# Patient Record
Sex: MALE | Race: WHITE | URBAN - METROPOLITAN AREA
[De-identification: names, ages, dates, MRNs, and addresses within clinical notes are randomized per-mention and may not be internally consistent; named-entity substitution may affect disease eponyms.]

---

## 2018-08-27 ENCOUNTER — APPOINTMENT (OUTPATIENT)
Dept: URBAN - METROPOLITAN AREA SURGERY 18 | Age: 43
Setting detail: DERMATOLOGY
End: 2018-08-27

## 2018-08-27 VITALS — SYSTOLIC BLOOD PRESSURE: 123 MMHG | HEART RATE: 62 BPM | DIASTOLIC BLOOD PRESSURE: 73 MMHG

## 2018-08-27 PROBLEM — D04.22 CARCINOMA IN SITU OF SKIN OF LEFT EAR AND EXTERNAL AURICULAR CANAL: Status: ACTIVE | Noted: 2018-08-27

## 2018-08-27 PROCEDURE — 15260 FTH/GFT FR N/E/E/L 20 SQCM/<: CPT

## 2018-08-27 PROCEDURE — 17311 MOHS 1 STAGE H/N/HF/G: CPT

## 2018-08-27 PROCEDURE — 17312 MOHS ADDL STAGE: CPT

## 2018-08-27 PROCEDURE — OTHER MOHS SURGERY: OTHER

## 2018-08-27 NOTE — PROCEDURE: MOHS SURGERY
Referred To Oculoplastics For Closure Text (Leave Blank If You Do Not Want): After obtaining clear surgical margins the patient was sent to oculoplastics for surgical repair.  The patient understands they will receive post-surgical care and follow-up from the oculoplastic surgeon's and referring physician's office, and may follow up in our surgical clinic as needed.

## 2018-08-27 NOTE — PROCEDURE: MOHS SURGERY
Advancement Flap (Single) Text: Given the location of the defect, inherent tension at the surgical site, and the proximity to free margins an advancement flap was deemed most appropriate for wound reconstruction. The risks, benefits, and possible outcomes of this procedure were discussed along with the risks, benefits, and possible outcomes of other options for wound repair (including but not limited to: complex closure, other flaps, grafts, and second intention). The patient verbalized understanding and consent to the outlined procedure. The patient verbalized understanding that intraoperative conditions may necessitate a change in the outlined procedure resulting in modification of the original surgical plan. This decision may be made at the discretion of the surgeon, and is due to factors subject to change or that are difficult to predict preoperatively. Using a sterile surgical marker, an appropriate advancement flap was drawn incorporating the defect and placing the expected incisions within the relaxed skin tension lines where possible. The surgical site and surrounding skin were prepped and draped in sterile fashion.  Standing cones were removed from opposite ends of the defect within the appropriate surgical plane, repositioning as necessary based upon local tension, proximity to free margins/other anatomic structures, and position of the relaxed skin tension lines. The resulting defect was undermined widely and bilaterally in the appropriate surgical plane taking care to preserve local arteries, veins, nerves, and other structures of anatomic importance. This created a sliding flap capable of advancing across the defect to close the wound under minimal tension. Hemostasis was achieved and then the wound was sutured in layered fashion.

## 2018-08-27 NOTE — PROCEDURE: MOHS SURGERY
Post-Care Instructions: I reviewed with the patient and present family members in detail post-care instructions. Patient is not to engage in any heavy lifting, exercise, or swimming for the next 14 days, resuming light to moderate and then regular exercise as directed. Should the patient develop any fevers, chills, bleeding, severe pain patient will contact the office immediately.

## 2018-08-27 NOTE — PROCEDURE: MOHS SURGERY
Referred To Otolaryngology For Closure Text (Leave Blank If You Do Not Want): After obtaining clear surgical margins the patient was sent to otolaryngology for surgical repair.  The patient understands they will receive post-surgical care and follow-up from the Otolaryngologyist's and referring physician's office, and may follow up in our surgical clinic as needed.

## 2018-08-27 NOTE — PROCEDURE: MOHS SURGERY
Bilobed Transposition Flap Text: Given the location of the defect, inherent tension at the surgical site, and the proximity to free margins a bilobe transposition flap was deemed most appropriate for wound reconstruction. The risks, benefits, and possible outcomes of this procedure were discussed along with the risks, benefits, and possible outcomes of other options for wound repair (including but not limited to: complex closure, other flaps, grafts, and second intention). The patient verbalized understanding and consent to the outlined procedure. The patient verbalized understanding that intraoperative conditions may necessitate a change in the outlined procedure resulting in modification of the original surgical plan. This decision may be made at the discretion of the surgeon, and is due to factors subject to change or that are difficult to predict preoperatively. We also discussed that should the patient heal with a scar they find noticeable/unfavorable additional surgical procedures and/or referral to additional specialists may be required. The patient understands the healing process takes place over many months and that the \"final\" appearance of the scar line often takes a year or longer to achieve.\\n\\nUsing a sterile surgical marker, an appropriate bilobe transposition flap was drawn incorporating the defect and placing the expected incisions within the relaxed skin tension lines where possible. The surgical site and surrounding skin were prepped and draped in sterile fashion.  A single standing cone was removed at the expected pivot point of the transposition flap in the appropriate surgical plane, repositioning as necessary based upon local tension, proximity to free margins/other anatomic structures, and position of the relaxed skin tension lines. The flap (containing two lobes  by 45 degrees, each proportionate in size to the postop defect) was incised and raised in the appropriate surgical plane (just below the level of the nasalis musculature). The resulting defect was undermined widely and bilaterally in the appropriate surgical plane taking care to preserve local arteries, veins, nerves, and other structures of anatomic importance. This created a flap capable of transposing across the defect to close the wound under minimal tension, without distortion of adjacent free margins. Hemostasis was achieved and then the wound was sutured in layered fashion.

## 2018-08-27 NOTE — PROCEDURE: MOHS SURGERY
Island Pedicle Flap Text: Given the location of the defect, inherent tension at the surgical site, and the proximity to free margins an island pedicle advancement flap was deemed most appropriate for wound reconstruction. The risks, benefits, and possible outcomes of this procedure were discussed along with the risks, benefits, and possible outcomes of other options for wound repair (including but not limited to: complex closure, other flaps, grafts, and second intention). The patient verbalized understanding and consent to the outlined procedure. The patient verbalized understanding that intraoperative conditions may necessitate a change in the outlined procedure resulting in modification of the original surgical plan. This decision may be made at the discretion of the surgeon, and is due to factors subject to change or that are difficult to predict preoperatively. \\n\\nUsing a sterile surgical marker, an appropriate island pedicle advancement flap was drawn incorporating the defect and placing the expected incisions within the relaxed skin tension lines where possible. The surgical site and surrounding skin were prepped and draped in sterile fashion.  The island pedicle advancement flap was incised and undermined within the appropriate surgical plane, creating a mobile flap supported by a central vascular pedicle.  The wound edges surrounding the flap and primary defect were undermined widely and bilaterally in the appropriate surgical plane taking care to preserve local arteries, veins, nerves, and other structures of anatomic importance. This created a sliding flap capable of advancing across the defect to close the wound under minimal tension. Hemostasis was achieved and then the wound was sutured in layered fashion.

## 2018-08-27 NOTE — PROCEDURE: MOHS SURGERY
Body Location Override (Optional - Billing Will Still Be Based On Selected Body Map Location If Applicable): Left Superior Helix

## 2018-08-27 NOTE — PROCEDURE: MOHS SURGERY
Advancement Flap (Double) Text: Given the location of the defect, inherent tension at the surgical site, and the proximity to free margins a double (bilateral) advancement flap was deemed most appropriate for wound reconstruction. The risks, benefits, and possible outcomes of this procedure were discussed along with the risks, benefits, and possible outcomes of other options for wound repair (including but not limited to: complex closure, other flaps, grafts, and second intention). The patient verbalized understanding and consent to the outlined procedure. The patient verbalized understanding that intraoperative conditions may necessitate a change in the outlined procedure resulting in modification of the original surgical plan. This decision may be made at the discretion of the surgeon, and is due to factors subject to change or that are difficult to predict preoperatively. Using a sterile surgical marker, an appropriate bilateral advancement flap was drawn incorporating the defect and placing the expected incisions within the relaxed skin tension lines where possible. The surgical site and surrounding skin were prepped and draped in sterile fashion.  Standing cones were removed from opposite ends of the defect within the appropriate surgical plane, repositioning as necessary based upon local tension, proximity to free margins/other anatomic structures, and position of the relaxed skin tension lines. The resulting defect was undermined widely and bilaterally in the appropriate surgical plane taking care to preserve local arteries, veins, nerves, and other structures of anatomic importance. This created a sliding flap capable of advancing bilaterally across the defect to close the wound under minimal tension. Hemostasis was achieved and then the wound was sutured in layered fashion.

## 2018-08-27 NOTE — PROCEDURE: MOHS SURGERY
Modified Advancement Flap Text: Given the location of the defect, inherent tension at the surgical site, and the proximity to free margins a modified advancement flap was deemed most appropriate for wound reconstruction. The risks, benefits, and possible outcomes of this procedure were discussed along with the risks, benefits, and possible outcomes of other options for wound repair (including but not limited to: complex closure, other flaps, grafts, and second intention). The patient verbalized understanding and consent to the outlined procedure. The patient verbalized understanding that intraoperative conditions may necessitate a change in the outlined procedure resulting in modification of the original surgical plan. This decision may be made at the discretion of the surgeon, and is due to factors subject to change or that are difficult to predict preoperatively. Using a sterile surgical marker, an appropriate advancement flap was drawn incorporating the defect and placing the expected incisions within the relaxed skin tension lines where possible. Prior to anesthetization the vermillion border/white roll were demarcated with a sterile marker to aid in reapproximation during wound closure. \\n\\nThe surgical site and surrounding skin were prepped and draped in sterile fashion.  Standing cones were removed from opposite ends of the defect within the appropriate surgical plane, repositioning as necessary based upon local tension, proximity to free margins/other anatomic structures, and position of the relaxed skin tension lines. The resulting defect was undermined widely and bilaterally in the appropriate surgical plane taking care to preserve local arteries, veins, nerves, and other structures of anatomic importance. This created a sliding flap capable of advancing across the defect to close the wound under minimal tension. Care was taken to preserve the orbicularis muscle during standing cone removal and undermining. Hemostasis was achieved and then the wound was sutured in layered fashion.\\n

## 2018-08-27 NOTE — PROCEDURE: MOHS SURGERY
Helical Rim Advancement Flap Text: Given the location of the defect, inherent tension at the surgical site, and the proximity to free margins a helical rim advancement flap was deemed most appropriate for wound reconstruction. The risks, benefits, and possible outcomes of this procedure were discussed along with the risks, benefits, and possible outcomes of other options for wound repair (including but not limited to: complex closure, other flaps, grafts, and second intention). The patient verbalized understanding and consent to the outlined procedure. The patient verbalized understanding that intraoperative conditions may necessitate a change in the outlined procedure resulting in modification of the original surgical plan. This decision may be made at the discretion of the surgeon, and is due to factors subject to change or that are difficult to predict preoperatively. Using a sterile surgical marker, an appropriate helical rim advancement flap was drawn incorporating the defect and placing the expected incisions within the relaxed skin tension lines where possible. The surgical site and surrounding skin were prepped and draped in sterile fashion. Anesthesia was checked and supplemented as necessary. A single standing cone was then excised posterior to the defect. A linear arc-like incision was removed at the anterior aspect of the defect and carried to a point of relative auricular skin laxity. A second dog ear was then removed at the end of this incision. The flap was undermined widely and bilaterally in the appropriate surgical plane. Hemostasis was obtained and then the flap was sutured together in layered fashion.

## 2018-08-27 NOTE — PROCEDURE: MOHS SURGERY
Intermediate Repair Preamble Text (Leave Blank If You Do Not Want): The risks, benefits, and possible outcomes of this procedure were discussed along with the risks, benefits, and possible outcomes of other options for wound repair (including but not limited to: variations of complex closure, flaps, grafts, and second intention). The patient verbalized understanding and consent to the outlined procedure. The patient understands should the surgical site heal in a manner they find unsatisfactory further interventions or referral to an additional specialist may be required. Following sterile preparation and drape, two standing cones were removed at opposite ends of the postoperative defect (as needed) within, or parallel to, the relaxed skin tension lines. The resulting defect was undermined, as necessary, in the appropriate surgical plane taking care to preserve local arteries, veins, nerves, and other structures of anatomic importance. Hemostasis was achieved and then the wound was sutured together in layered fashion.

## 2018-08-27 NOTE — PROCEDURE: MOHS SURGERY
Melolabial Transposition Flap Text: Given the size/depth/location of the defect, inherent tension at the surgical site, and the proximity to free margins, a one stage nasolabial transposition flap was deemed most appropriate for wound reconstruction. The risks, benefits, and possible outcomes of this procedure were discussed along with the risks, benefits, and possible outcomes of other options for wound repair (including but not limited to: complex closure, other flaps, grafts, second intention, and combinations of these procedures). The patient verbalized understanding and consent to the outlined procedure. The patient verbalized understanding that intraoperative conditions may necessitate a change in the outlined procedure resulting in modification of the original surgical plan. This decision may be made at the discretion of the surgeon, and is due to factors subject to change or that are difficult to predict preoperatively. We also discussed complex defects involving multiple cosmetic subunits often necessitate several smaller planned surgical revisions spread out over the course of many months in order to restore appropriate tissue volume, cosmetic landmarks, and diminish scarring. In many cases anatomic “semblance” is possible, but complete parity is not. The patient understands that in some cases referral to additional specialists for additional procedures may be required. \\n\\nUsing a sterile surgical marker, an appropriate nasolabial transposition flap was drawn incorporating the defect and placing the expected incisions within the relaxed skin tension lines where possible. The surgical site and surrounding skin were prepped and draped in sterile fashion. Anesthesia was checked and supplemented as necessary.  A single standing cone was removed along the nasal sidewall proximal to the surgical defect. The flap and second standing cone were incised and raised from the medial cheek in the mid adipose. The entire flap and area surrounding the removed standing cones was undermined widely and bilaterally in the appropriate surgical plane taking care to preserve local arteries, veins, nerves, and other structures of anatomic importance. The area along the distal nasal portion of the post Mohs defect was undermined just enough to place deep buried vertical mattress sutures. This created a flap capable of transposing into the defect to drape the wound under minimal tension. Hemostasis was achieved. A single anchoring buried vertical mattress suture was placed in the deep tissue/periosteum surrounding the pyriform aperture to advance the cheek portion of the flap without distortion of the free margin of the ala. The remaining flap was sutured in layered fashion.

## 2018-08-27 NOTE — PROCEDURE: MOHS SURGERY
Graft Donor Site Bandage (Optional-Leave Blank If You Don't Want In Note): Vaseline, a nonstick bandage, brown tape and a pressure bandage were applied to the donor site.

## 2018-08-27 NOTE — PROCEDURE: MOHS SURGERY
A-T Advancement Flap Text: Given the location of the defect, inherent tension at the surgical site, and the proximity to free margins an A to T advancement flap was deemed most appropriate for wound reconstruction. The risks, benefits, and possible outcomes of this procedure were discussed along with the risks, benefits, and possible outcomes of other options for wound repair (including but not limited to: complex closure, other flaps, grafts, and second intention). The patient verbalized understanding and consent to the outlined procedure. The patient verbalized understanding that intraoperative conditions may necessitate a change in the outlined procedure resulting in modification of the original surgical plan. This decision may be made at the discretion of the surgeon, and is due to factors subject to change or that are difficult to predict preoperatively. Using a sterile surgical marker, an appropriate A to T advancement flap was drawn incorporating the defect and placing the expected incisions within the relaxed skin tension lines where possible. The surgical site and surrounding skin were prepped and draped in sterile fashion.  Standing cones were removed from opposite ends of the defect within the appropriate surgical plane, repositioning as necessary based upon local tension, proximity to free margins/other anatomic structures, and position of the relaxed skin tension lines. The shape of one standing cone was modified, taking two smaller standing cones (each approximately half the size of the traditional standing cone) and repositioning them along opposite ends of a lateral plane (with respect to the defect).  The resulting defect was undermined widely and bilaterally in the appropriate surgical plane taking care to preserve local arteries, veins, nerves, and other structures of anatomic importance. This created a sliding flap capable of advancing across the defect to close the wound under minimal tension. Hemostasis was achieved and then the wound was sutured in layered fashion.

## 2018-08-27 NOTE — PROCEDURE: MOHS SURGERY
Unique Flap 1 Text: Given the location of the defect, inherent tension at the surgical site, and the proximity to free margins a Mercedes (Tripolar) Advancement Flap was deemed most appropriate for wound reconstruction. The risks, benefits, and possible outcomes of this procedure were discussed along with the risks, benefits, and possible outcomes of other options for wound repair (including but not limited to: complex closure, other flaps, grafts, and second intention). The patient verbalized understanding and consent to the outlined procedure. The patient verbalized understanding that intraoperative conditions may necessitate a change in the outlined procedure resulting in modification of the original surgical plan. This decision may be made at the discretion of the surgeon, and is due to factors subject to change or that are difficult to predict preoperatively. Using a sterile surgical marker, an appropriate tripolar advancement flap was drawn incorporating the defect and placing the expected incisions within the relaxed skin tension lines where possible. The surgical site and surrounding skin were prepped and draped in sterile fashion.  Anesthesia was checked and supplemented as necessary. Standing cones were removed from three “poles” of the defect within the appropriate surgical plane, repositioning as necessary based upon local tension, proximity to free margins/other anatomic structures, and position of the relaxed skin tension lines. The resulting defect was undermined widely and bilaterally in the appropriate surgical plane taking care to preserve local arteries, veins, nerves, and other structures of anatomic importance. This created a sliding flap capable of advancing across the defect from three reservoirs to close the wound under minimal tension. Hemostasis was achieved and then the wound was sutured in layered fashion.

## 2018-08-27 NOTE — PROCEDURE: MOHS SURGERY
Referred To Plastics For Closure Text (Leave Blank If You Do Not Want): After obtaining clear surgical margins the patient was sent to plastics for surgical repair. The patient understands they will receive post-surgical care and follow-up from the plastic surgeon's and referring physician's office, and may follow up in our surgical clinic as needed.

## 2018-08-27 NOTE — PROCEDURE: MOHS SURGERY
Burow's Advancement Flap Text: Given the location of the defect, inherent tension at the surgical site, and the proximity to free margins a Burow’s advancement flap was deemed most appropriate for wound reconstruction. The risks, benefits, and possible outcomes of this procedure were discussed along with the risks, benefits, and possible outcomes of other options for wound repair (including but not limited to: complex closure, other flaps, grafts, and second intention). The patient verbalized understanding and consent to the outlined procedure. The patient verbalized understanding that intraoperative conditions may necessitate a change in the outlined procedure resulting in modification of the original surgical plan. This decision may be made at the discretion of the surgeon, and is due to factors subject to change or that are difficult to predict preoperatively. Using a sterile surgical marker, an appropriate Burow’s advancement flap was drawn incorporating the defect and placing the expected incisions within the relaxed skin tension lines where possible. The surgical site and surrounding skin were prepped and draped in sterile fashion.  Standing cones were removed from opposite ends of the defect within the appropriate surgical plane, repositioning as necessary based upon local tension, proximity to free margins/other anatomic structures, and position of the relaxed skin tension lines. The position of one dog ear was modified, moving the standing cone along a lateral plane to lie in a more favorable location for closure.  The resulting defect was undermined widely and bilaterally in the appropriate surgical plane taking care to preserve local arteries, veins, nerves, and other structures of anatomic importance. This created a sliding flap capable of advancing across the defect to close the wound under minimal tension. Hemostasis was achieved and then the wound was sutured in layered fashion.

## 2018-08-27 NOTE — PROCEDURE: MOHS SURGERY
Secondary Intention Text (Leave Blank If You Do Not Want): Given the location of the defect, depth of the postoperative wound and inherent tension at the surgical site, healing by secondary intention was deemed most appropriate for wound closure. The risks, benefits, and possible outcomes of this procedure were discussed along with the risks, benefits, and possible outcomes of other options for wound repair (including but not limited to: complex closure, flaps, and grafts). The patient verbalized understanding and voiced a preference for secondary intention over reconstruction. The patient understands that not all healing occurs in predictable fashion or within a predictable time period. If the resulting scar is unsatisfactory, affects/distorts a free margin or other adjacent structures, or is slow to heal the patient understands further surgical intervention or even referral to an additional specialist may be required.

## 2018-08-27 NOTE — PROCEDURE: MOHS SURGERY
Crescentic Advancement Flap Text: Given the location of the defect, inherent tension at the surgical site, and the proximity to free margins a Crescentic advancement flap was deemed most appropriate for wound reconstruction. The risks, benefits, and possible outcomes of this procedure were discussed along with the risks, benefits, and possible outcomes of other options for wound repair (including but not limited to: complex closure, other flaps, grafts, and second intention). The patient verbalized understanding and consent to the outlined procedure. The patient verbalized understanding that intraoperative conditions may necessitate a change in the outlined procedure resulting in modification of the original surgical plan. This decision may be made at the discretion of the surgeon, and is due to factors subject to change or that are difficult to predict preoperatively. Using a sterile surgical marker, an appropriate Crescentic advancement flap was drawn incorporating the defect and placing the expected incisions within the relaxed skin tension lines where possible. The surgical site and surrounding skin were prepped and draped in sterile fashion.  Standing cones were removed from opposite ends of the defect within the appropriate surgical plane, repositioning as necessary based upon local tension, proximity to free margins/other anatomic structures, and position of the relaxed skin tension lines. The shape of one standing cone was modified, designed in a crescentic shape to allow for sliding movement in both the horizontal and vertical plane.  The resulting defect was undermined widely and bilaterally in the appropriate surgical plane taking care to preserve local arteries, veins, nerves, and other structures of anatomic importance. This created a sliding flap capable of advancing across the defect to close the wound under minimal tension. Hemostasis was achieved and then the wound was sutured in layered fashion.

## 2018-08-27 NOTE — PROCEDURE: MOHS SURGERY
Complex Repair Preamble Text (Leave Blank If You Do Not Want): The risks, benefits, and possible outcomes of this procedure were discussed along with the risks, benefits, and possible outcomes of other options for wound repair (including but not limited to: variations of complex closure, flaps, grafts, and second intention). The patient verbalized understanding and consent to the outlined procedure. The patient understands should the surgical site heal in a manner they find unsatisfactory further interventions or referral to an additional specialist may be required. Following sterile preparation and drape, two standing cones were removed at opposite ends of the postoperative defect within, or parallel to, the relaxed skin tension lines. The resulting defect was undermined widely and bilaterally in the appropriate surgical plane taking care to preserve local arteries, veins, nerves, and other structures of anatomic importance. Hemostasis was achieved and then the wound was sutured together in layered fashion.

## 2018-08-27 NOTE — PROCEDURE: MOHS SURGERY
Rhombic Flap Text: Given the location of the defect, inherent tension at the surgical site, and the proximity to free margins a rhombic transposition flap was deemed most appropriate for wound reconstruction. The risks, benefits, and possible outcomes of this procedure were discussed along with the risks, benefits, and possible outcomes of other options for wound repair (including but not limited to: complex closure, other flaps, grafts, and second intention). The patient verbalized understanding and consent to the outlined procedure. The patient verbalized understanding that intraoperative conditions may necessitate a change in the outlined procedure resulting in modification of the original surgical plan. This decision may be made at the discretion of the surgeon, and is due to factors subject to change or that are difficult to predict preoperatively. Using a sterile surgical marker, an appropriate rhombic transposition flap was drawn incorporating the defect and placing the expected incisions within the relaxed skin tension lines where possible. The surgical site and surrounding skin were prepped and draped in sterile fashion.  A single standing cone was removed at the expected pivot point of the transposition flap in the appropriate surgical plane, repositioning as necessary based upon local tension, proximity to free margins/other anatomic structures, and position of the relaxed skin tension lines. The flap (designed approximately 90 degrees from one surgical axis) was incised and raised in the appropriate surgical plane (just below the level of the nasalis musculature). The resulting defect was undermined widely and bilaterally in the appropriate surgical plane taking care to preserve local arteries, veins, nerves, and other structures of anatomic importance. This created a flap capable of transposing across the defect to close the wound under minimal tension, without distortion of adjacent free margins. Hemostasis was achieved and then the wound was sutured in layered fashion.

## 2018-09-04 ENCOUNTER — APPOINTMENT (OUTPATIENT)
Dept: URBAN - METROPOLITAN AREA SURGERY 18 | Age: 43
Setting detail: DERMATOLOGY
End: 2018-09-04

## 2018-09-04 DIAGNOSIS — Z48.817 ENCOUNTER FOR SURGICAL AFTERCARE FOLLOWING SURGERY ON THE SKIN AND SUBCUTANEOUS TISSUE: ICD-10-CM

## 2018-09-04 PROCEDURE — OTHER POST-OP WOUND CHECK: OTHER

## 2018-09-04 PROCEDURE — 99024 POSTOP FOLLOW-UP VISIT: CPT

## 2018-09-04 ASSESSMENT — LOCATION ZONE DERM: LOCATION ZONE: EAR

## 2018-09-04 ASSESSMENT — LOCATION SIMPLE DESCRIPTION DERM: LOCATION SIMPLE: LEFT EAR

## 2018-09-04 ASSESSMENT — LOCATION DETAILED DESCRIPTION DERM: LOCATION DETAILED: LEFT SUPERIOR HELIX

## 2018-09-26 NOTE — PROCEDURE: MOHS SURGERY
Primary Defect Width In Cm (Final Defect Size - Required For Flaps/Grafts): 0.9 V-Y Flap Text: The defect edges were debeveled with a #15 scalpel blade.  Given the location of the defect, shape of the defect and the proximity to free margins a V-Y flap was deemed most appropriate.  Using a sterile surgical marker, an appropriate advancement flap was drawn incorporating the defect and placing the expected incisions within the relaxed skin tension lines where possible.    The area thus outlined was incised deep to adipose tissue with a #15 scalpel blade.  The skin margins were undermined to an appropriate distance in all directions utilizing iris scissors.

## 2021-06-24 NOTE — PROCEDURE: MOHS SURGERY
not applicable Subsequent Stages Histo Method Verbiage: Using a similar technique to that described above, a thin layer of tissue was removed from all areas where tumor was visible on the previous stage.  The tissue was again oriented, mapped, dyed, and processed as above.